# Patient Record
Sex: FEMALE | Race: WHITE | NOT HISPANIC OR LATINO | Employment: UNEMPLOYED | URBAN - METROPOLITAN AREA
[De-identification: names, ages, dates, MRNs, and addresses within clinical notes are randomized per-mention and may not be internally consistent; named-entity substitution may affect disease eponyms.]

---

## 2021-04-20 ENCOUNTER — HOSPITAL ENCOUNTER (EMERGENCY)
Facility: CLINIC | Age: 41
Discharge: HOME | End: 2021-04-20
Attending: NURSE PRACTITIONER | Admitting: NURSE PRACTITIONER
Payer: COMMERCIAL

## 2021-04-20 VITALS
OXYGEN SATURATION: 100 % | SYSTOLIC BLOOD PRESSURE: 131 MMHG | DIASTOLIC BLOOD PRESSURE: 46 MMHG | TEMPERATURE: 97.1 F | HEART RATE: 109 BPM | WEIGHT: 113 LBS | BODY MASS INDEX: 19.4 KG/M2 | RESPIRATION RATE: 18 BRPM

## 2021-04-20 DIAGNOSIS — R42 DIZZINESS: ICD-10-CM

## 2021-04-20 DIAGNOSIS — M79.10 MYALGIA: ICD-10-CM

## 2021-04-20 DIAGNOSIS — J02.9 SORE THROAT: ICD-10-CM

## 2021-04-20 LAB
DEPRECATED S PYO AG THROAT QL EIA: NEGATIVE
FLUAV RNA RESP QL NAA+PROBE: NEGATIVE
FLUBV RNA RESP QL NAA+PROBE: NEGATIVE
LABORATORY COMMENT REPORT: ABNORMAL
RSV RNA SPEC QL NAA+PROBE: ABNORMAL
SARS-COV-2 RNA RESP QL NAA+PROBE: POSITIVE
SPECIMEN SOURCE: ABNORMAL
SPECIMEN SOURCE: NORMAL
SPECIMEN SOURCE: NORMAL
STREP GROUP A PCR: NOT DETECTED

## 2021-04-20 PROCEDURE — C9803 HOPD COVID-19 SPEC COLLECT: HCPCS | Performed by: NURSE PRACTITIONER

## 2021-04-20 PROCEDURE — G0463 HOSPITAL OUTPT CLINIC VISIT: HCPCS | Performed by: NURSE PRACTITIONER

## 2021-04-20 PROCEDURE — 87636 SARSCOV2 & INF A&B AMP PRB: CPT | Performed by: NURSE PRACTITIONER

## 2021-04-20 PROCEDURE — 87651 STREP A DNA AMP PROBE: CPT | Performed by: NURSE PRACTITIONER

## 2021-04-20 PROCEDURE — 999N001174 HC STATISTIC STREP A RAPID: Performed by: NURSE PRACTITIONER

## 2021-04-20 PROCEDURE — 99213 OFFICE O/P EST LOW 20 MIN: CPT | Performed by: NURSE PRACTITIONER

## 2021-04-21 NOTE — ED PROVIDER NOTES
History     Chief Complaint   Patient presents with     Generalized Body Aches     Pt here with body aches that started on Sunday. Pt thought she had a fever due to being achy all over and tylenol made it feel better.     Pharyngitis     Started on Sunday     HPI  Ariana Stephens is a 40 year old female who presents to urgent care for evaluation of myalgia and sore throat that started 2 days ago.  No significant congestion or cough.  Feels intermittently lightheaded when standing up.  Denies fevers, but has had chills.  No vomiting or diarrhea.  No change in taste or smell.  Normal appetite.  No known ill contacts.  Current every day smoker.    Allergies:  No Known Allergies    Problem List:    Patient Active Problem List    Diagnosis Date Noted     Gastroparesis 01/02/2013     Priority: Medium     Gastric outlet obstruction 01/02/2013     Priority: Medium     Abdominal pain 01/02/2013     Priority: Medium     Methamphetamine abuse (H) 01/02/2013     Priority: Medium     Mild major depression (H) 01/02/2013     Priority: Medium     Late effect of adverse effect of drug, medical or biological substance 10/23/2006     Priority: Medium        Past Medical History:    History reviewed. No pertinent past medical history.    Past Surgical History:    Past Surgical History:   Procedure Laterality Date     ESOPHAGOSCOPY, GASTROSCOPY, DUODENOSCOPY (EGD), COMBINED  1/3/2013    Procedure: COMBINED ESOPHAGOSCOPY, GASTROSCOPY, DUODENOSCOPY (EGD);  Gastroscopy  ;  Surgeon: Tomas Shukla MD;  Location: [facility]       Family History:    Family History   Problem Relation Age of Onset     Lipids Father      Diabetes Father         pre diabetic       Social History:  Marital Status:  Single [1]  Social History     Tobacco Use     Smoking status: Current Every Day Smoker     Smokeless tobacco: Never Used   Substance Use Topics     Alcohol use: No     Drug use: Yes        Medications:    No current outpatient medications on  file.        Review of Systems  As mentioned above in the history present illness. All other systems were reviewed and are negative.    Physical Exam   BP: 131/46  Pulse: 109  Temp: 97.1  F (36.2  C)  Resp: 18  Weight: 51.3 kg (113 lb)  SpO2: 100 %      Physical Exam  GENERAL APPEARANCE: healthy, alert and no acute distress  EYES: conjunctiva clear  HENT: external ears and canals normal. Right TM normal. Left TM normal. Nose normal.  Posterior oropharynx erythema without exudate.  Uvula is midline.  No unilateral peritonsillar swelling. No trismus  NECK: supple, nontender, no lymphadenopathy  RESP: lungs clear to auscultation - no rales, rhonchi or wheezes  CV: regular rates and rhythm, no murmur noted    ED Course        Procedures               Results for orders placed or performed during the hospital encounter of 04/20/21 (from the past 24 hour(s))   Streptococcus A Rapid Scr w Reflx to PCR    Specimen: Throat   Result Value Ref Range    Strep Specimen Description Throat     Streptococcus Group A Rapid Screen Negative NEG^Negative       Medications - No data to display    Assessments & Plan (with Medical Decision Making)   History and exam is consistent with viral pharyngitis and could be COVID-19 infection, result is pending.  Rapid strep is negative.  I discussed with patient to continue to monitor symptoms.  She should recheck if she develops chest pain, shortness of breath, vomiting, or worse in any way.  Patient inquired about possibly Lyme's disease as she reports having recent tick bites in the last couple weeks.  instructed to recheck if Covid is negative and she continues to have body aches or fevers without any evidence of URI.    I have reviewed the nursing notes.    I have reviewed the findings, diagnosis, plan and need for follow up with the patient.      There are no discharge medications for this patient.      Final diagnoses:   Myalgia   Sore throat   Dizziness       4/20/2021   [facility]  [place] EMERGENCY DEPT     Candelario, JUSTIN Garcia CNP  04/20/21 1932

## 2021-04-21 NOTE — DISCHARGE INSTRUCTIONS
Rapid strep is negative.  Covid-19 test result is pending.  Quarantine until you know the results of the Covid test.  Drink plenty of fluids.  Rest.  Tylenol 650 mg every 4-6 hours as needed.  Return for chest pain, increased work of breathing shortness of breath, vomiting, or worse in any way.

## 2021-04-21 NOTE — ED NOTES
Per verbal order from Quynh Palomino CPRN-CNP patient advised on positive covid results. David Gutierrez LPN on 4/20/2021 at 8:00 PM

## 2021-05-26 ENCOUNTER — RECORDS - HEALTHEAST (OUTPATIENT)
Dept: ADMINISTRATIVE | Facility: CLINIC | Age: 41
End: 2021-05-26

## 2022-03-04 ENCOUNTER — APPOINTMENT (OUTPATIENT)
Dept: CT IMAGING | Facility: CLINIC | Age: 42
End: 2022-03-04
Attending: EMERGENCY MEDICINE
Payer: COMMERCIAL

## 2022-03-04 ENCOUNTER — APPOINTMENT (OUTPATIENT)
Dept: GENERAL RADIOLOGY | Facility: CLINIC | Age: 42
End: 2022-03-04
Attending: EMERGENCY MEDICINE
Payer: COMMERCIAL

## 2022-03-04 ENCOUNTER — HOSPITAL ENCOUNTER (EMERGENCY)
Facility: CLINIC | Age: 42
Discharge: HOME | End: 2022-03-04
Attending: EMERGENCY MEDICINE | Admitting: EMERGENCY MEDICINE
Payer: COMMERCIAL

## 2022-03-04 VITALS
BODY MASS INDEX: 20.6 KG/M2 | HEART RATE: 110 BPM | SYSTOLIC BLOOD PRESSURE: 133 MMHG | WEIGHT: 120 LBS | OXYGEN SATURATION: 100 % | TEMPERATURE: 97.4 F | RESPIRATION RATE: 20 BRPM | DIASTOLIC BLOOD PRESSURE: 93 MMHG

## 2022-03-04 DIAGNOSIS — S60.042A CONTUSION OF LEFT RING FINGER WITHOUT DAMAGE TO NAIL, INITIAL ENCOUNTER: ICD-10-CM

## 2022-03-04 DIAGNOSIS — S00.83XA CONTUSION OF FACE, SCALP AND NECK, INITIAL ENCOUNTER: ICD-10-CM

## 2022-03-04 DIAGNOSIS — T74.91XA DOMESTIC VIOLENCE OF ADULT, INITIAL ENCOUNTER: ICD-10-CM

## 2022-03-04 DIAGNOSIS — S00.03XA CONTUSION OF FACE, SCALP AND NECK, INITIAL ENCOUNTER: ICD-10-CM

## 2022-03-04 DIAGNOSIS — S20.224A CONTUSION OF MIDDLE BACK WALL OF THORAX, INITIAL ENCOUNTER: ICD-10-CM

## 2022-03-04 DIAGNOSIS — S10.93XA CONTUSION OF FACE, SCALP AND NECK, INITIAL ENCOUNTER: ICD-10-CM

## 2022-03-04 DIAGNOSIS — S80.12XA MULTIPLE LEG CONTUSIONS, LEFT, INITIAL ENCOUNTER: ICD-10-CM

## 2022-03-04 DIAGNOSIS — S20.219A CONTUSION OF CHEST WALL, UNSPECIFIED LATERALITY, INITIAL ENCOUNTER: ICD-10-CM

## 2022-03-04 PROCEDURE — 99285 EMERGENCY DEPT VISIT HI MDM: CPT | Mod: 25 | Performed by: EMERGENCY MEDICINE

## 2022-03-04 PROCEDURE — 71046 X-RAY EXAM CHEST 2 VIEWS: CPT

## 2022-03-04 PROCEDURE — 99284 EMERGENCY DEPT VISIT MOD MDM: CPT | Performed by: EMERGENCY MEDICINE

## 2022-03-04 PROCEDURE — 72070 X-RAY EXAM THORAC SPINE 2VWS: CPT

## 2022-03-04 PROCEDURE — 70110 X-RAY EXAM OF JAW 4/> VIEWS: CPT

## 2022-03-04 PROCEDURE — 70486 CT MAXILLOFACIAL W/O DYE: CPT

## 2022-03-04 RX ORDER — IOPAMIDOL 755 MG/ML
80 INJECTION, SOLUTION INTRAVASCULAR ONCE
Status: DISCONTINUED | OUTPATIENT
Start: 2022-03-04 | End: 2022-03-04 | Stop reason: CLARIF
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
## 2022-03-04 NOTE — DISCHARGE INSTRUCTIONS
Return if symptoms worsen or new symptoms develop.  Use ice and heat as needed take ibuprofen and Tylenol for pain drink plenty fluids if symptoms worsen or new symptoms develop please return for recheck.  If any concerns about safety please return for further evaluation and care.

## 2022-04-26 NOTE — LETTER
April 20, 2021      To Whom It May Concern:      Ariana Stephens was seen in our urgent care today, 04/20/21.  Please excuse her from work tomorrow, 4/21/2021 to illness.    Sincerely,        JUSTIN Maynard CNP        
None
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
## 2024-05-27 ENCOUNTER — HOSPITAL ENCOUNTER (EMERGENCY)
Facility: CLINIC | Age: 44
Discharge: HOME | End: 2024-05-27
Attending: NURSE PRACTITIONER | Admitting: NURSE PRACTITIONER
Payer: COMMERCIAL

## 2024-05-27 VITALS
RESPIRATION RATE: 17 BRPM | HEART RATE: 102 BPM | OXYGEN SATURATION: 99 % | TEMPERATURE: 97.7 F | DIASTOLIC BLOOD PRESSURE: 53 MMHG | SYSTOLIC BLOOD PRESSURE: 127 MMHG

## 2024-05-27 DIAGNOSIS — S30.861A TICK BITE OF ABDOMEN, INITIAL ENCOUNTER: ICD-10-CM
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
## 2024-05-27 PROCEDURE — 99213 OFFICE O/P EST LOW 20 MIN: CPT | Performed by: NURSE PRACTITIONER

## 2024-05-27 PROCEDURE — G0463 HOSPITAL OUTPT CLINIC VISIT: HCPCS | Performed by: NURSE PRACTITIONER

## 2024-05-27 PROCEDURE — 250N000013 HC RX MED GY IP 250 OP 250 PS 637: Performed by: NURSE PRACTITIONER

## 2024-05-27 RX ORDER — DOXYCYCLINE 100 MG/1
100 CAPSULE ORAL EVERY 12 HOURS SCHEDULED
Status: COMPLETED | OUTPATIENT
Start: 2024-05-27 | End: 2024-05-27

## 2024-05-27 RX ORDER — DOXYCYCLINE 100 MG/1
100 CAPSULE ORAL 2 TIMES DAILY
Qty: 28 CAPSULE | Refills: 0 | Status: SHIPPED | OUTPATIENT
Start: 2024-05-27 | End: 2024-06-10

## 2024-05-27 RX ADMIN — DOXYCYCLINE HYCLATE 100 MG: 100 CAPSULE ORAL at 19:13

## 2024-05-27 ASSESSMENT — COLUMBIA-SUICIDE SEVERITY RATING SCALE - C-SSRS
6. HAVE YOU EVER DONE ANYTHING, STARTED TO DO ANYTHING, OR PREPARED TO DO ANYTHING TO END YOUR LIFE?: NO
1. IN THE PAST MONTH, HAVE YOU WISHED YOU WERE DEAD OR WISHED YOU COULD GO TO SLEEP AND NOT WAKE UP?: NO
2. HAVE YOU ACTUALLY HAD ANY THOUGHTS OF KILLING YOURSELF IN THE PAST MONTH?: NO

## 2024-05-27 ASSESSMENT — ACTIVITIES OF DAILY LIVING (ADL): ADLS_ACUITY_SCORE: 35

## 2024-05-28 NOTE — ED PROVIDER NOTES
ED Provider Note  [facility]      History   No chief complaint on file.    HPI  Ariana Stephens is a 44 year old female who presents to the urgent care for report of a black legged deer tick which she removed from her abdomen 2 days ago.  Patient is concerned she is unsure how long the tick was present on her skin, believes that the tick likely was on her skin greater than 48 hours possibly less than 72 hours however she is unsure about this and request to be treated with doxycycline.  Currently does not have a erythema migrans rash but patient is aware that approximately 30% of patients that have deer tick bites do not develop erythema migrans even with Lyme's disease.  Patient denies any fever, chills, nausea, vomiting does report having a headache.  Denies any visual acuity changes, denies any mentation changes.             Allergies:  No Known Allergies    Problem List:    Patient Active Problem List    Diagnosis Date Noted    Gastroparesis 01/02/2013     Priority: Medium    Gastric outlet obstruction 01/02/2013     Priority: Medium    Abdominal pain 01/02/2013     Priority: Medium    Methamphetamine abuse (H) 01/02/2013     Priority: Medium    Mild major depression (H) 01/02/2013     Priority: Medium    Late effect of adverse effect of drug, medical or biological substance 10/23/2006     Priority: Medium        Past Medical History:    No past medical history on file.    Past Surgical History:    Past Surgical History:   Procedure Laterality Date    ESOPHAGOSCOPY, GASTROSCOPY, DUODENOSCOPY (EGD), COMBINED  1/3/2013    Procedure: COMBINED ESOPHAGOSCOPY, GASTROSCOPY, DUODENOSCOPY (EGD);  Gastroscopy  ;  Surgeon: Tomas Shukla MD;  [facility]: [facility]       Family History:    Family History   Problem Relation Age of Onset    Lipids Father     Diabetes Father         pre diabetic       Social History:  Marital Status:  Single [1]  Social History     Tobacco Use    Smoking status: Every Day     Smokeless tobacco: Never   Substance Use Topics    Alcohol use: No    Drug use: Yes        Medications:    doxycycline hyclate (VIBRAMYCIN) 100 MG capsule          Review of Systems  A medically appropriate review of systems was performed with pertinent positives and negatives noted in the HPI, and all other systems negative.    Physical Exam   Patient Vitals for the past 24 hrs:   BP Temp Temp src Pulse Resp SpO2   05/27/24 1902 127/53 97.7  F (36.5  C) Tympanic 102 17 99 %          Physical Exam  General: alert and in no acute distress on arrival  Ears/Nose/Throat: ENT: Left Ear: TM intact, middle ear is not erythremic, no purulence, canal patent. Right Ear: TM intact, middle ear is not erythremic, no purulence, canal patent. Nose: No erythema or edema patent nostrils bilateral. Throat: midline uvula, non-erythremic, non-enlarged tonsils, without exudate.  No cervical adenopathy.   Head: atraumatic, normocephalic  Lungs:  nonlabored  CV:  RRR, extremities warm and perfused, no edema in lower extremities.  Abd: nondistended, nontender, no HSM  Skin: no rashes, no diaphoresis and skin color normal, has a small erythemic reddened area where tick was removed this does not appear to be an erythema migrans rash pattern.  Neuro: Patient awake, alert, speech is fluent, normal deficits identified, normal cranial nerve function.  Psychiatric: affect/mood normal, appropriate historian.      ED Course                 Procedures                    No results found for this or any previous visit (from the past 24 hour(s)).    MEDICATIONS GIVEN IN THE EMERGENCY DEPARTMENT:  Medications   doxycycline hyclate (VIBRAMYCIN) capsule 100 mg (100 mg Oral $Given 5/27/24 1913)                Assessments & Plan (with Medical Decision Making)  44 year old female who presents to the Urgent Care for evaluation of tick bite of abdomen, initial encounter.  Patient reports that she lives in an area that is considered densely infected with  black legged deer ticks, reports that the tick that she removed from her abdomen was a deer tick, reports that she is unsure how long the tick was attached to her skin believes that there is a possibility that this was greater than 72 hours.  She does not have a characteristic erythema migrans rash pattern however patient is aware that approximately 30% of patients with these tick bites will not develop erythema migrans skin rash but may go on to develop Lyme's disease.  Advised if Lyme's disease testing is requested 6 weeks from the tick bite is recommended to prevent false negative testing results with testing too early.  Patient is aware that it takes approximately 6 weeks to sero-convert positive testing from this tick bite.  Information provided to patient was obtained from the [facility] public website and the CDC tick bite recommendations for treatment.     I have reviewed the nursing notes.    I have reviewed the findings, diagnosis, plan and need for follow up with the patient.        NEW PRESCRIPTIONS STARTED AT TODAY'S ER VISIT  New Prescriptions    DOXYCYCLINE HYCLATE (VIBRAMYCIN) 100 MG CAPSULE    Take 1 capsule (100 mg) by mouth 2 times daily for 14 days       Final diagnoses:   Tick bite of abdomen, initial encounter       5/27/2024   [facility] EMERGENCY DEPT       Jenny Marin, JUSTIN CNP  06/13/24 1222

## 2024-05-28 NOTE — DISCHARGE INSTRUCTIONS
Antibiotics are ordered for you doxycycline ordered twice daily for 14 days recommend finishing all of oral antibiotics please follow in primary clinic if you have further concerns or if symptoms are worse despite antibiotics please return here for follow-up

## 2025-06-18 ENCOUNTER — PATIENT OUTREACH (OUTPATIENT)
Dept: CARE COORDINATION | Facility: CLINIC | Age: 45
End: 2025-06-18
Payer: COMMERCIAL